# Patient Record
Sex: MALE | Race: WHITE
[De-identification: names, ages, dates, MRNs, and addresses within clinical notes are randomized per-mention and may not be internally consistent; named-entity substitution may affect disease eponyms.]

---

## 2019-12-11 ENCOUNTER — HOSPITAL ENCOUNTER (EMERGENCY)
Dept: HOSPITAL 53 - M ED | Age: 49
Discharge: HOME | End: 2019-12-11
Payer: COMMERCIAL

## 2019-12-11 VITALS — HEIGHT: 71 IN | BODY MASS INDEX: 30.16 KG/M2 | WEIGHT: 215.46 LBS

## 2019-12-11 VITALS — DIASTOLIC BLOOD PRESSURE: 79 MMHG | SYSTOLIC BLOOD PRESSURE: 120 MMHG

## 2019-12-11 DIAGNOSIS — Z79.2: ICD-10-CM

## 2019-12-11 DIAGNOSIS — N20.1: Primary | ICD-10-CM

## 2019-12-11 DIAGNOSIS — Z79.899: ICD-10-CM

## 2019-12-11 DIAGNOSIS — K57.30: ICD-10-CM

## 2019-12-11 LAB
ALBUMIN SERPL BCG-MCNC: 3.8 GM/DL (ref 3.2–5.2)
ALT SERPL W P-5'-P-CCNC: 39 U/L (ref 12–78)
BASOPHILS # BLD AUTO: 0 10^3/UL (ref 0–0.2)
BASOPHILS NFR BLD AUTO: 0.6 % (ref 0–1)
BILIRUB CONJ SERPL-MCNC: 0.1 MG/DL (ref 0–0.2)
BILIRUB SERPL-MCNC: 0.5 MG/DL (ref 0.2–1)
BUN SERPL-MCNC: 22 MG/DL (ref 7–18)
CALCIUM SERPL-MCNC: 8.4 MG/DL (ref 8.5–10.1)
CHLORIDE SERPL-SCNC: 111 MEQ/L (ref 98–107)
CO2 SERPL-SCNC: 27 MEQ/L (ref 21–32)
CREAT SERPL-MCNC: 1.15 MG/DL (ref 0.7–1.3)
EOSINOPHIL # BLD AUTO: 0.1 10^3/UL (ref 0–0.5)
EOSINOPHIL NFR BLD AUTO: 1.2 % (ref 0–3)
GFR SERPL CREATININE-BSD FRML MDRD: > 60 ML/MIN/{1.73_M2} (ref 60–?)
GLUCOSE SERPL-MCNC: 108 MG/DL (ref 70–100)
HCT VFR BLD AUTO: 48 % (ref 42–52)
HGB BLD-MCNC: 15.4 G/DL (ref 13.5–17.5)
LIPASE SERPL-CCNC: 100 U/L (ref 73–393)
LYMPHOCYTES # BLD AUTO: 1.9 10^3/UL (ref 1.5–5)
LYMPHOCYTES NFR BLD AUTO: 27.8 % (ref 24–44)
MCH RBC QN AUTO: 29.4 PG (ref 27–33)
MCHC RBC AUTO-ENTMCNC: 32.1 G/DL (ref 32–36.5)
MCV RBC AUTO: 91.8 FL (ref 80–96)
MONOCYTES # BLD AUTO: 0.7 10^3/UL (ref 0–0.8)
MONOCYTES NFR BLD AUTO: 10.7 % (ref 0–5)
NEUTROPHILS # BLD AUTO: 4 10^3/UL (ref 1.5–8.5)
NEUTROPHILS NFR BLD AUTO: 59.3 % (ref 36–66)
PLATELET # BLD AUTO: 179 10^3/UL (ref 150–450)
POTASSIUM SERPL-SCNC: 4.1 MEQ/L (ref 3.5–5.1)
PROT SERPL-MCNC: 6.9 GM/DL (ref 6.4–8.2)
RBC # BLD AUTO: 5.23 10^6/UL (ref 4.3–6.1)
SODIUM SERPL-SCNC: 144 MEQ/L (ref 136–145)
WBC # BLD AUTO: 6.8 10^3/UL (ref 4–10)

## 2019-12-11 PROCEDURE — 80076 HEPATIC FUNCTION PANEL: CPT

## 2019-12-11 PROCEDURE — 99284 EMERGENCY DEPT VISIT MOD MDM: CPT

## 2019-12-11 PROCEDURE — 96375 TX/PRO/DX INJ NEW DRUG ADDON: CPT

## 2019-12-11 PROCEDURE — 85025 COMPLETE CBC W/AUTO DIFF WBC: CPT

## 2019-12-11 PROCEDURE — 96374 THER/PROPH/DIAG INJ IV PUSH: CPT

## 2019-12-11 PROCEDURE — 74176 CT ABD & PELVIS W/O CONTRAST: CPT

## 2019-12-11 PROCEDURE — 80048 BASIC METABOLIC PNL TOTAL CA: CPT

## 2019-12-11 PROCEDURE — 81001 URINALYSIS AUTO W/SCOPE: CPT

## 2019-12-11 PROCEDURE — 93041 RHYTHM ECG TRACING: CPT

## 2019-12-11 PROCEDURE — 83690 ASSAY OF LIPASE: CPT

## 2019-12-11 NOTE — REPVR
PROCEDURE INFORMATION: 

Exam: CT Abdomen And Pelvis Without Contrast 

Exam date and time: 12/11/2019 3:16 AM 

Age: 49 years old 

Clinical history: Abdominal pain; Flank; Right; Additional info: Right sided 

pain, hematuria 



TECHNIQUE: 

Imaging protocol: Computed tomography of the abdomen and pelvis without 

contrast. 

Radiation optimization: All CT scans at this facility use at least one of these 

dose optimization techniques: automated exposure control; mA and/or kV 

adjustment per patient size (includes targeted exams where dose is matched to 

clinical indication); or iterative reconstruction. 



COMPARISON: 

No relevant prior studies available. 



FINDINGS: 



Liver: Normal. No mass. 

Gallbladder and bile ducts: Normal. No calcified stones. No ductal dilation. 

Pancreas: Normal. No ductal dilation. 

Spleen: Normal. No splenomegaly. 

Adrenals: Normal. No mass. 

Kidneys and ureters: Right perinephric stranding and induration and minimal 

right hydronephrosis and hydroureter with periureteral edema which extends to 

the right UVJ with a 2 mm distal right UVJ calculus. 

Stomach and bowel: There is colonic diverticulosis without evidence of 

diverticulitis. 

Appendix: A normal appendix is seen. 

Intraperitoneal space: Unremarkable. No free air. No significant fluid 

collection. 

Vasculature: Unremarkable. No abdominal aortic aneurysm. 

Lymph nodes: Unremarkable. No enlarged lymph nodes. 



Bladder: Unremarkable as visualized. 

Reproductive: Unremarkable as visualized. 

Bones/joints: Unremarkable. No acute fracture. 

Soft tissues: Clips and dense material about the right inguinal canal 

suggesting prior hernia repair. 



IMPRESSION: 

1. 2 mm distal right UVJ calculus with obstructive uropathy of the right upper 

tract. 

2. Colonic diverticulosis without diverticulitis. 

3. Suggestion of prior right inguinal hernia repair. 



Electronically signed by: Cali Barrett On 12/11/2019  04:18:57 AM

## 2020-12-03 ENCOUNTER — HOSPITAL ENCOUNTER (OUTPATIENT)
Dept: HOSPITAL 53 - M LAB REF | Age: 50
End: 2020-12-03
Attending: INTERNAL MEDICINE
Payer: COMMERCIAL

## 2020-12-03 DIAGNOSIS — E04.2: Primary | ICD-10-CM

## 2021-01-20 ENCOUNTER — HOSPITAL ENCOUNTER (OUTPATIENT)
Dept: HOSPITAL 53 - M RAD | Age: 51
End: 2021-01-20
Attending: INTERNAL MEDICINE
Payer: COMMERCIAL

## 2021-01-20 DIAGNOSIS — M79.605: ICD-10-CM

## 2021-01-20 DIAGNOSIS — R22.42: Primary | ICD-10-CM

## 2021-04-01 ENCOUNTER — HOSPITAL ENCOUNTER (OUTPATIENT)
Dept: HOSPITAL 53 - M RAD | Age: 51
End: 2021-04-01
Attending: INTERNAL MEDICINE
Payer: COMMERCIAL

## 2021-04-01 DIAGNOSIS — I82.402: Primary | ICD-10-CM

## 2021-04-02 NOTE — REP
INDICATION:

F/U DVT.



COMPARISON:

None.



TECHNIQUE:

Left lower extremity deep vein duplex ultrasound for thrombus



FINDINGS:

There are no comparison studies are film file.

Duplex deep vein ultrasound performed from the popliteal vein to the common femoral

vein.

There is eccentric echogenic thrombus extending from the popliteal vein to the mid

femoral vein suggestive of nonocclusive thrombus.



IMPRESSION:

There is eccentric nonocclusive thrombus extending from the popliteal vein to the mid

femoral vein.

This could be chronic.  There are no comparison studies.





<Electronically signed by Carlos Jane > 04/02/21 [id number]

## 2021-06-10 ENCOUNTER — HOSPITAL ENCOUNTER (OUTPATIENT)
Dept: HOSPITAL 53 - M RAD | Age: 51
End: 2021-06-10
Attending: PHYSICIAN ASSISTANT
Payer: COMMERCIAL

## 2021-06-10 DIAGNOSIS — R22.2: Primary | ICD-10-CM

## 2021-06-10 PROCEDURE — 71260 CT THORAX DX C+: CPT

## 2021-06-13 NOTE — REP
INDICATION:

SOFT TISSUE DENSITY



COMPARISON:

None



TECHNIQUE:

Axial contrast enhanced images from the thoracic inlet to the upper abdomen with

coronal and sagittal reformations using 75 ml Isovue 370 intravenous contrast material.



This CT examination was performed using the following dose reduction techniques:

Automated exposure control, adjustment of mA and/or kv according to the patient's

size, and use of iterative reconstruction technique.



FINDINGS:

The bilateral lung fields are relatively well aerated and essentially clear.  Minimal

chronic changes are appreciated including 1 cm non solid ground-glass nodular density

in the apical right lower lobe remaining stable compared to 2020.  No acute

consolidation, significant nodule, or mass.  No effusion.  No pneumothorax.

Tracheobronchial tree is patent.



Mediastinum demonstrates normal thoracic aorta, pulmonary vasculature, and

heart/pericardium.  No significant/abnormal anterior mediastinal soft tissues

currently appreciated.  No axillary, hilar, or mediastinal adenopathy.



Surrounding musculoskeletal structures are intact.  Limited upper abdomen demonstrates

normal bilateral adrenal glands and mild fatty infiltration to the liver.



IMPRESSION:

1. Normal contrast-enhanced chest CT.

2. No acute mediastinal or pleuroparenchymal process.

3. Questionable anterior mediastinal abnormality on prior examination is no longer

appreciated.





<Electronically signed by Krzysztof Sanders > 06/13/21 [id number]

## 2021-10-01 ENCOUNTER — HOSPITAL ENCOUNTER (OUTPATIENT)
Dept: HOSPITAL 53 - M RAD | Age: 51
End: 2021-10-01
Attending: INTERNAL MEDICINE
Payer: COMMERCIAL

## 2021-10-01 DIAGNOSIS — I82.502: Primary | ICD-10-CM

## 2021-10-01 NOTE — REP
INDICATION:

DVT F/U.



COMPARISON:

04/01/2021.



TECHNIQUE:

Multiple ultrasonographic images of the deep venous structures of the left lower

extremity were obtained from the inguinal ligament to the ankle. Venous compression

techniques, color doppler imaging, and augmentation techniques were also obtained

where appropriate.  As per the ACR guidelines the anterior tibial vein can not be

effectively evaluated.  Only compression techniques in the calf on the peroneal and

posterior tibial veins was attempted/performed.



FINDINGS:

Once a deep vein has been exposed to a thrombus the walls of that vein are for ever

changed as fibrin alters the integrity of the venous wall making it less amenable to

coaptation.  A non coaptable venous wall is the 1st sign of thrombosis.  Additionally,

scar tissue can give the same ultrasonographic appearance of an acute thrombus.



Once again, in the left superficial femoral vein there is echogenic material along the

vessel wall.  Sonographically, this appears unchanged.  Coaptation is unobtainable.



IMPRESSION:

Left superficial femoral vein sonographic findings as described above.  Whether this

represents an acute thrombus superimposed upon chronic change or purely chronic change

cannot be determined by this ultrasound exam.







<Electronically signed by Marvin Nguyen > 10/01/21 [id number]